# Patient Record
Sex: FEMALE | Race: WHITE | NOT HISPANIC OR LATINO | Employment: FULL TIME | URBAN - METROPOLITAN AREA
[De-identification: names, ages, dates, MRNs, and addresses within clinical notes are randomized per-mention and may not be internally consistent; named-entity substitution may affect disease eponyms.]

---

## 2022-07-09 ENCOUNTER — OFFICE VISIT (OUTPATIENT)
Dept: FAMILY MEDICINE | Facility: CLINIC | Age: 37
End: 2022-07-09
Payer: COMMERCIAL

## 2022-07-09 VITALS
TEMPERATURE: 97.5 F | SYSTOLIC BLOOD PRESSURE: 96 MMHG | OXYGEN SATURATION: 98 % | DIASTOLIC BLOOD PRESSURE: 70 MMHG | HEART RATE: 85 BPM

## 2022-07-09 DIAGNOSIS — R39.9 UTI SYMPTOMS: Primary | ICD-10-CM

## 2022-07-09 LAB
BACTERIA #/AREA URNS HPF: ABNORMAL /HPF
RBC #/AREA URNS AUTO: ABNORMAL /HPF
SQUAMOUS #/AREA URNS AUTO: ABNORMAL /LPF
WBC #/AREA URNS AUTO: ABNORMAL /HPF

## 2022-07-09 PROCEDURE — 99203 OFFICE O/P NEW LOW 30 MIN: CPT

## 2022-07-09 PROCEDURE — 87086 URINE CULTURE/COLONY COUNT: CPT | Performed by: FAMILY MEDICINE

## 2022-07-09 PROCEDURE — 87186 SC STD MICRODIL/AGAR DIL: CPT | Performed by: FAMILY MEDICINE

## 2022-07-09 PROCEDURE — 81015 MICROSCOPIC EXAM OF URINE: CPT

## 2022-07-09 RX ORDER — ACETAMINOPHEN 500 MG
1000 TABLET ORAL
COMMUNITY

## 2022-07-09 RX ORDER — NITROFURANTOIN 25; 75 MG/1; MG/1
100 CAPSULE ORAL 2 TIMES DAILY
Qty: 14 CAPSULE | Refills: 0 | Status: SHIPPED | OUTPATIENT
Start: 2022-07-09 | End: 2022-07-16

## 2022-07-09 RX ORDER — DULOXETIN HYDROCHLORIDE 30 MG/1
30 CAPSULE, DELAYED RELEASE ORAL
COMMUNITY
Start: 2022-02-22 | End: 2023-02-27

## 2022-07-09 RX ORDER — DROSPIRENONE AND ETHINYL ESTRADIOL 0.02-3(28)
1 KIT ORAL
COMMUNITY
Start: 2022-03-21

## 2022-07-09 NOTE — PROGRESS NOTES
ICD-10-CM    1. UTI symptoms  R39.9 Urine Microscopic     Urine Microscopic     Urine Culture     Urine Culture     nitroFURantoin macrocrystal-monohydrate (MACROBID) 100 MG capsule     CANCELED: UA reflex to Microscopic and Culture     Strongly suspect UTI given similarity of symptoms to past infections.  Unable to perform urine dipstick at this facility due to patient's recent use of AZO.  Urine micro and culture are pending.    PLAN:  Patient Instructions   Nitrofurantoin 100 mg twice a day for the next 7 days.  Drink plenty of fluids.    We will call if there are any surprises in your urine test results.    SUBJECTIVE:  Yusra Cheema is a 37 year old female who presents to  with worsening dysuria and urgency since Thursday.  No hematuria.  Feels similar to past UTIs.  Last UTI was over a year ago.  No vaginal symptoms right now.  No fevers.    Had Bactrim once and it dropped her white blood cell count almost to zero.  Added this to Epic.    OBJECTIVE:  BP 96/70   Pulse 85   Temp 97.5  F (36.4  C) (Tympanic)   LMP 06/05/2022   SpO2 98%   GEN: well-appearing, in NAD

## 2022-07-09 NOTE — PATIENT INSTRUCTIONS
Nitrofurantoin 100 mg twice a day for the next 7 days.  Drink plenty of fluids.    We will call if there are any surprises in your urine test results.

## 2022-07-12 LAB — BACTERIA UR CULT: ABNORMAL
